# Patient Record
Sex: MALE | Race: BLACK OR AFRICAN AMERICAN | NOT HISPANIC OR LATINO | Employment: UNEMPLOYED | URBAN - METROPOLITAN AREA
[De-identification: names, ages, dates, MRNs, and addresses within clinical notes are randomized per-mention and may not be internally consistent; named-entity substitution may affect disease eponyms.]

---

## 2019-09-11 ENCOUNTER — HOSPITAL ENCOUNTER (EMERGENCY)
Facility: HOSPITAL | Age: 34
Discharge: HOME | End: 2019-09-11
Attending: EMERGENCY MEDICINE
Payer: MEDICAID

## 2019-09-11 VITALS
SYSTOLIC BLOOD PRESSURE: 125 MMHG | DIASTOLIC BLOOD PRESSURE: 76 MMHG | BODY MASS INDEX: 22.4 KG/M2 | HEIGHT: 71 IN | RESPIRATION RATE: 41 BRPM | HEART RATE: 61 BPM | OXYGEN SATURATION: 98 % | TEMPERATURE: 99 F | WEIGHT: 160 LBS

## 2019-09-11 DIAGNOSIS — R10.84 GENERALIZED ABDOMINAL PAIN: ICD-10-CM

## 2019-09-11 DIAGNOSIS — Z91.199 MEDICALLY NONCOMPLIANT: ICD-10-CM

## 2019-09-11 DIAGNOSIS — R11.2 NON-INTRACTABLE VOMITING WITH NAUSEA, UNSPECIFIED VOMITING TYPE: Primary | ICD-10-CM

## 2019-09-11 LAB
ALBUMIN SERPL BCP-MCNC: 5 G/DL (ref 3.5–5.2)
ALP SERPL-CCNC: 114 U/L (ref 55–135)
ALT SERPL W/O P-5'-P-CCNC: 19 U/L (ref 10–44)
AMPHET+METHAMPHET UR QL: NEGATIVE
ANION GAP SERPL CALC-SCNC: 13 MMOL/L (ref 8–16)
AST SERPL-CCNC: 39 U/L (ref 10–40)
BACTERIA #/AREA URNS HPF: ABNORMAL /HPF
BARBITURATES UR QL SCN>200 NG/ML: NEGATIVE
BASOPHILS # BLD AUTO: 0.01 K/UL (ref 0–0.2)
BASOPHILS NFR BLD: 0.1 % (ref 0–1.9)
BENZODIAZ UR QL SCN>200 NG/ML: NEGATIVE
BILIRUB SERPL-MCNC: 1.5 MG/DL (ref 0.1–1)
BILIRUB UR QL STRIP: NEGATIVE
BUN SERPL-MCNC: 20 MG/DL (ref 6–20)
BZE UR QL SCN: NORMAL
CALCIUM SERPL-MCNC: 10.6 MG/DL (ref 8.7–10.5)
CANNABINOIDS UR QL SCN: NORMAL
CHLORIDE SERPL-SCNC: 97 MMOL/L (ref 95–110)
CLARITY UR: CLEAR
CO2 SERPL-SCNC: 25 MMOL/L (ref 23–29)
COLOR UR: YELLOW
CREAT SERPL-MCNC: 1.3 MG/DL (ref 0.5–1.4)
CREAT UR-MCNC: 310.2 MG/DL (ref 23–375)
DIFFERENTIAL METHOD: ABNORMAL
EOSINOPHIL # BLD AUTO: 0 K/UL (ref 0–0.5)
EOSINOPHIL NFR BLD: 0.1 % (ref 0–8)
ERYTHROCYTE [DISTWIDTH] IN BLOOD BY AUTOMATED COUNT: 13.1 % (ref 11.5–14.5)
EST. GFR  (AFRICAN AMERICAN): >60 ML/MIN/1.73 M^2
EST. GFR  (NON AFRICAN AMERICAN): >60 ML/MIN/1.73 M^2
GLUCOSE SERPL-MCNC: 93 MG/DL (ref 70–110)
GLUCOSE UR QL STRIP: NEGATIVE
HCT VFR BLD AUTO: 44.3 % (ref 40–54)
HGB BLD-MCNC: 15.6 G/DL (ref 14–18)
HGB UR QL STRIP: ABNORMAL
HYALINE CASTS #/AREA URNS LPF: 0 /LPF
KETONES UR QL STRIP: ABNORMAL
LEUKOCYTE ESTERASE UR QL STRIP: NEGATIVE
LIPASE SERPL-CCNC: 23 U/L (ref 4–60)
LYMPHOCYTES # BLD AUTO: 1.9 K/UL (ref 1–4.8)
LYMPHOCYTES NFR BLD: 26.5 % (ref 18–48)
MCH RBC QN AUTO: 33.6 PG (ref 27–31)
MCHC RBC AUTO-ENTMCNC: 35.2 G/DL (ref 32–36)
MCV RBC AUTO: 96 FL (ref 82–98)
METHADONE UR QL SCN>300 NG/ML: NEGATIVE
MICROSCOPIC COMMENT: ABNORMAL
MONOCYTES # BLD AUTO: 0.5 K/UL (ref 0.3–1)
MONOCYTES NFR BLD: 7.3 % (ref 4–15)
NEUTROPHILS # BLD AUTO: 4.7 K/UL (ref 1.8–7.7)
NEUTROPHILS NFR BLD: 66 % (ref 38–73)
NITRITE UR QL STRIP: NEGATIVE
OPIATES UR QL SCN: NEGATIVE
PCP UR QL SCN>25 NG/ML: NEGATIVE
PH UR STRIP: 6 [PH] (ref 5–8)
PLATELET # BLD AUTO: 187 K/UL (ref 150–350)
PMV BLD AUTO: 10.2 FL (ref 9.2–12.9)
POTASSIUM SERPL-SCNC: 3.3 MMOL/L (ref 3.5–5.1)
PROT SERPL-MCNC: 11 G/DL (ref 6–8.4)
PROT UR QL STRIP: ABNORMAL
RBC # BLD AUTO: 4.64 M/UL (ref 4.6–6.2)
RBC #/AREA URNS HPF: 3 /HPF (ref 0–4)
SODIUM SERPL-SCNC: 135 MMOL/L (ref 136–145)
SP GR UR STRIP: 1.02 (ref 1–1.03)
TOXICOLOGY INFORMATION: NORMAL
URN SPEC COLLECT METH UR: ABNORMAL
UROBILINOGEN UR STRIP-ACNC: 1 EU/DL
WBC # BLD AUTO: 7.14 K/UL (ref 3.9–12.7)
WBC #/AREA URNS HPF: 2 /HPF (ref 0–5)

## 2019-09-11 PROCEDURE — 96374 THER/PROPH/DIAG INJ IV PUSH: CPT

## 2019-09-11 PROCEDURE — 25000003 PHARM REV CODE 250: Performed by: EMERGENCY MEDICINE

## 2019-09-11 PROCEDURE — 99284 EMERGENCY DEPT VISIT MOD MDM: CPT | Mod: 25

## 2019-09-11 PROCEDURE — 80307 DRUG TEST PRSMV CHEM ANLYZR: CPT

## 2019-09-11 PROCEDURE — 80053 COMPREHEN METABOLIC PANEL: CPT

## 2019-09-11 PROCEDURE — 81000 URINALYSIS NONAUTO W/SCOPE: CPT | Mod: 59

## 2019-09-11 PROCEDURE — 96361 HYDRATE IV INFUSION ADD-ON: CPT

## 2019-09-11 PROCEDURE — 83690 ASSAY OF LIPASE: CPT

## 2019-09-11 PROCEDURE — 63600175 PHARM REV CODE 636 W HCPCS: Performed by: EMERGENCY MEDICINE

## 2019-09-11 PROCEDURE — 85025 COMPLETE CBC W/AUTO DIFF WBC: CPT

## 2019-09-11 RX ORDER — CAPSAICIN 0.75 MG/G
CREAM TOPICAL 3 TIMES DAILY
Status: DISCONTINUED | OUTPATIENT
Start: 2019-09-11 | End: 2019-09-11 | Stop reason: HOSPADM

## 2019-09-11 RX ORDER — ONDANSETRON 4 MG/1
4 TABLET, FILM COATED ORAL EVERY 6 HOURS
Qty: 12 TABLET | Refills: 0 | Status: SHIPPED | OUTPATIENT
Start: 2019-09-11

## 2019-09-11 RX ORDER — SODIUM CHLORIDE 9 MG/ML
1000 INJECTION, SOLUTION INTRAVENOUS
Status: COMPLETED | OUTPATIENT
Start: 2019-09-11 | End: 2019-09-11

## 2019-09-11 RX ORDER — PROMETHAZINE HYDROCHLORIDE 25 MG/1
25 TABLET ORAL EVERY 6 HOURS PRN
Qty: 15 TABLET | Refills: 0 | Status: SHIPPED | OUTPATIENT
Start: 2019-09-11

## 2019-09-11 RX ORDER — HALOPERIDOL 5 MG/ML
5 INJECTION INTRAMUSCULAR
Status: COMPLETED | OUTPATIENT
Start: 2019-09-11 | End: 2019-09-11

## 2019-09-11 RX ADMIN — SODIUM CHLORIDE 1000 ML: 0.9 INJECTION, SOLUTION INTRAVENOUS at 05:09

## 2019-09-11 RX ADMIN — HALOPERIDOL LACTATE 5 MG: 5 INJECTION, SOLUTION INTRAMUSCULAR at 05:09

## 2019-09-11 RX ADMIN — CAPSAICIN: 0.75 CREAM TOPICAL at 06:09

## 2019-09-11 NOTE — ED NOTES
Pt reports improvement in abd pain and nausea. States pain is 7/10. Pt is NAD at this time resting comfortably on cardiac monitor. Will continue to monitor.

## 2019-09-11 NOTE — ED PROVIDER NOTES
Encounter Date: 9/11/2019    SCRIBE #1 NOTE: I, Keely Hines, am scribing for, and in the presence of,  Dr. Lefort. I have scribed the entire note.       History     Chief Complaint   Patient presents with    Abdominal Pain     Abdominal pain with N/V x 2 days. Presents awake, alert, oriented. States pain is severe. No h/o fever. Parent states that patient has been seen before for similar complaint     This is a 33 y.o. male who presents with complaint of vomiting. He reports onset of symptoms was 2 days ago. He denies any blood in vomit. The patient has associated abdominal pain and back pain but denies any diarrhea, urinary symptoms, fever or chills. He is not taking any medications for the symptoms. As per medical record the patient has experienced similar symptoms multiple times in the past. Of note the patient is non-compliant with HIV medications. He is uncertain of his last CD4 count    The history is provided by the patient.     Review of patient's allergies indicates:   Allergen Reactions    Maalox [alum-mag hydroxide-simeth]      Past Medical History:   Diagnosis Date    Asthma     HIV (human immunodeficiency virus infection)      History reviewed. No pertinent surgical history.  History reviewed. No pertinent family history.  Social History     Tobacco Use    Smoking status: Current Every Day Smoker     Types: Cigarettes    Smokeless tobacco: Never Used   Substance Use Topics    Alcohol use: No    Drug use: No     Review of Systems   Constitutional: Negative for chills and fever.   HENT: Negative for congestion, ear pain, rhinorrhea and sore throat.    Respiratory: Negative for cough, shortness of breath and wheezing.    Cardiovascular: Negative for chest pain and palpitations.   Gastrointestinal: Positive for abdominal pain, nausea and vomiting. Negative for diarrhea.   Genitourinary: Negative for dysuria and hematuria.   Musculoskeletal: Positive for back pain. Negative for myalgias and neck  pain.   Skin: Negative for rash.   Neurological: Negative for dizziness, weakness, light-headedness and headaches.   Psychiatric/Behavioral: Negative for confusion.       Physical Exam     Initial Vitals [09/11/19 1722]   BP Pulse Resp Temp SpO2   (!) 138/91 73 20 98.2 °F (36.8 °C) 100 %      MAP       --         Physical Exam    Nursing note and vitals reviewed.  Constitutional: He appears well-developed and well-nourished. He is not diaphoretic. He appears distressed.   Writhing in bed   HENT:   Head: Normocephalic and atraumatic.   Mouth/Throat: Oropharynx is clear and moist.   Eyes: Conjunctivae and EOM are normal.   Neck: Normal range of motion. Neck supple.   Cardiovascular: Normal rate, regular rhythm and normal heart sounds. Exam reveals no gallop and no friction rub.    No murmur heard.  Pulmonary/Chest: Breath sounds normal. He has no wheezes. He has no rhonchi. He has no rales.   Abdominal: Soft. There is tenderness (mild diffuse). There is no rebound and no guarding.   Musculoskeletal: Normal range of motion. He exhibits no edema or tenderness.   Lymphadenopathy:     He has no cervical adenopathy.   Neurological: He is alert and oriented to person, place, and time. He has normal strength.   Skin: Skin is warm and dry. No rash noted.         ED Course   Procedures  Labs Reviewed   CBC W/ AUTO DIFFERENTIAL - Abnormal; Notable for the following components:       Result Value    Mean Corpuscular Hemoglobin 33.6 (*)     All other components within normal limits   COMPREHENSIVE METABOLIC PANEL - Abnormal; Notable for the following components:    Sodium 135 (*)     Potassium 3.3 (*)     Calcium 10.6 (*)     Total Protein 11.0 (*)     Total Bilirubin 1.5 (*)     All other components within normal limits   URINALYSIS, REFLEX TO URINE CULTURE - Abnormal; Notable for the following components:    Protein, UA 1+ (*)     Ketones, UA 1+ (*)     Occult Blood UA Trace (*)     All other components within normal limits     Narrative:     Preferred Collection Type->Urine, Clean Catch   URINALYSIS MICROSCOPIC - Abnormal; Notable for the following components:    Bacteria Few (*)     All other components within normal limits    Narrative:     Preferred Collection Type->Urine, Clean Catch   LIPASE   DRUG SCREEN PANEL, URINE EMERGENCY    Narrative:     Preferred Collection Type->Urine, Clean Catch          Imaging Results    None          Medical Decision Making:   Differential Diagnosis:   Differential Diagnosis includes, but is not limited to:  AAA, aortic dissection, mesenteric ischemia, perforated viscous, MI/ACS, SBO/volvulus, incarcerated/strangulated hernia, intussusception, ileus, appendicitis, cholecystitis, cholangitis, diverticulitis, esophagitis, hepatitis, nephrolithiasis, pancreatitis, gastroenteritis, colitis, IBD/IBS, biliary colic, GERD, PUD, constipation, UTI/pyelonephritis,  disorder.    Clinical Tests:   Lab Tests: Ordered and Reviewed  ED Management:  After complete evaluation, including thorough history and physical exam, the patient's symptoms are most consistent with benign cause of abdominal pain. There is no rebound/guarding or other peritoneal signs to suggest perforation or other emergent surgical process. There is no fever or leukocytosis to suggest acute bacterial infection. There is no significant focal abdominal tenderness to suggest cholecystitis, appendicitis, diverticulitis, or  source, and the patient's current symptoms and clinical presentation do not warrant other targeted diagnostics at this time. CT A/P felt unlikely to outweigh risks of contrast/radiation at this time. The patient was treated with supportive care and improved. Will provide RX for sypmtoms upon D/C.                        Clinical Impression:     1. Non-intractable vomiting with nausea, unspecified vomiting type    2. Generalized abdominal pain    3. Medically noncompliant        Disposition:   Disposition: Discharged  Condition:  Stable    Scribe attestation I, Dr. Guy Lefort, personally performed the services described in this documentation. All medical record entries made by the scribe were at my direction and in my presence. I have reviewed the chart and agree that the record reflects my personal performance and is accurate and complete. Guy Lefort, MD.  11:26 PM 09/12/2019                      Guy J. Lefort, MD  09/12/19 3160

## 2019-09-11 NOTE — ED NOTES
Pt is asleep. Pt resting comfortably on stretcher. Pt's O2 sat is 89% on room air. Pt easily aroused and O2 sat is 97% when conversing. Pt placed on 2L oxygen via nasal cannula. Will continue to monitor.

## 2019-09-12 NOTE — DISCHARGE INSTRUCTIONS
You need follow up for HIV management, which will lead to fatal illness if left untreated.  Close follow up STRONGLY encouraged.

## 2019-09-12 NOTE — ED NOTES
Pt presents to the ED w/ c/ of abd pain with n/v for the past 2days. Pt is anxious at this time. Pt reports generalized weakness/ fatigue. Reports multiple episodes of emesis over the past 2 days. Pt reports hx of similar symptoms in the past. Pt is awake, alert, orientedx4. Pt denies fevers.

## 2021-02-21 ENCOUNTER — HOSPITAL ENCOUNTER (EMERGENCY)
Facility: OTHER | Age: 36
Discharge: HOME | End: 2021-02-21
Attending: EMERGENCY MEDICINE
Payer: MEDICAID

## 2021-02-21 VITALS
WEIGHT: 147 LBS | RESPIRATION RATE: 16 BRPM | BODY MASS INDEX: 20.58 KG/M2 | DIASTOLIC BLOOD PRESSURE: 71 MMHG | OXYGEN SATURATION: 100 % | HEART RATE: 89 BPM | TEMPERATURE: 99 F | HEIGHT: 71 IN | SYSTOLIC BLOOD PRESSURE: 116 MMHG

## 2021-02-21 DIAGNOSIS — K62.89 PROCTITIS: Primary | ICD-10-CM

## 2021-02-21 LAB
ALLENS TEST: ABNORMAL
ANION GAP SERPL CALC-SCNC: 13 MMOL/L (ref 8–16)
BUN SERPL-MCNC: 12 MG/DL (ref 6–20)
CALCIUM SERPL-MCNC: 9.4 MG/DL (ref 8.7–10.5)
CHLORIDE SERPL-SCNC: 100 MMOL/L (ref 95–110)
CO2 SERPL-SCNC: 23 MMOL/L (ref 23–29)
CREAT SERPL-MCNC: 0.9 MG/DL (ref 0.5–1.4)
EST. GFR  (AFRICAN AMERICAN): >60 ML/MIN/1.73 M^2
EST. GFR  (NON AFRICAN AMERICAN): >60 ML/MIN/1.73 M^2
GLUCOSE SERPL-MCNC: 90 MG/DL (ref 70–110)
HCO3 UR-SCNC: 30.6 MMOL/L (ref 24–28)
HCT VFR BLD CALC: 44 %PCV (ref 36–54)
HGB BLD-MCNC: 15 G/DL
PCO2 BLDA: 51.8 MMHG (ref 35–45)
PH SMN: 7.38 [PH] (ref 7.35–7.45)
PO2 BLDA: 19 MMHG (ref 40–60)
POC BE: 5 MMOL/L
POC IONIZED CALCIUM: 1.23 MMOL/L (ref 1.06–1.42)
POC SATURATED O2: 27 % (ref 95–100)
POC TCO2: 32 MMOL/L (ref 24–29)
POTASSIUM BLD-SCNC: 3.3 MMOL/L (ref 3.5–5.1)
POTASSIUM SERPL-SCNC: 3.5 MMOL/L (ref 3.5–5.1)
SAMPLE: ABNORMAL
SITE: ABNORMAL
SODIUM BLD-SCNC: 140 MMOL/L (ref 136–145)
SODIUM SERPL-SCNC: 136 MMOL/L (ref 136–145)

## 2021-02-21 PROCEDURE — 80048 BASIC METABOLIC PNL TOTAL CA: CPT

## 2021-02-21 PROCEDURE — 84295 ASSAY OF SERUM SODIUM: CPT | Mod: 91

## 2021-02-21 PROCEDURE — 84132 ASSAY OF SERUM POTASSIUM: CPT | Mod: 91

## 2021-02-21 PROCEDURE — 99900035 HC TECH TIME PER 15 MIN (STAT)

## 2021-02-21 PROCEDURE — 99285 EMERGENCY DEPT VISIT HI MDM: CPT | Mod: 25

## 2021-02-21 PROCEDURE — 25000003 PHARM REV CODE 250: Performed by: EMERGENCY MEDICINE

## 2021-02-21 PROCEDURE — 82803 BLOOD GASES ANY COMBINATION: CPT

## 2021-02-21 PROCEDURE — 25500020 PHARM REV CODE 255: Performed by: EMERGENCY MEDICINE

## 2021-02-21 PROCEDURE — 85014 HEMATOCRIT: CPT

## 2021-02-21 PROCEDURE — 82330 ASSAY OF CALCIUM: CPT

## 2021-02-21 PROCEDURE — 86361 T CELL ABSOLUTE COUNT: CPT

## 2021-02-21 RX ORDER — DOXYCYCLINE 100 MG/1
100 CAPSULE ORAL 2 TIMES DAILY
Qty: 42 CAPSULE | Refills: 0 | Status: SHIPPED | OUTPATIENT
Start: 2021-02-21 | End: 2021-03-14

## 2021-02-21 RX ORDER — DOXYCYCLINE HYCLATE 100 MG
100 TABLET ORAL ONCE
Status: COMPLETED | OUTPATIENT
Start: 2021-02-21 | End: 2021-02-21

## 2021-02-21 RX ADMIN — DOXYCYCLINE HYCLATE 100 MG: 100 TABLET, COATED ORAL at 08:02

## 2021-02-21 RX ADMIN — IOHEXOL 75 ML: 350 INJECTION, SOLUTION INTRAVENOUS at 07:02

## 2021-02-23 LAB
CD3+CD4+ CELLS # BLD: 596 CELLS/UL (ref 300–1400)
CD3+CD4+ CELLS NFR BLD: 35.8 % (ref 28–57)

## 2024-07-02 ENCOUNTER — HOSPITAL ENCOUNTER (EMERGENCY)
Facility: HOSPITAL | Age: 39
Discharge: HOME | End: 2024-07-02
Attending: EMERGENCY MEDICINE
Payer: COMMERCIAL

## 2024-07-02 VITALS
HEIGHT: 71 IN | SYSTOLIC BLOOD PRESSURE: 104 MMHG | WEIGHT: 150 LBS | HEART RATE: 71 BPM | OXYGEN SATURATION: 98 % | TEMPERATURE: 98 F | RESPIRATION RATE: 16 BRPM | DIASTOLIC BLOOD PRESSURE: 65 MMHG | BODY MASS INDEX: 21 KG/M2

## 2024-07-02 DIAGNOSIS — R20.2 PARESTHESIA OF FINGER: ICD-10-CM

## 2024-07-02 DIAGNOSIS — R53.1 WEAKNESS: Primary | ICD-10-CM

## 2024-07-02 LAB
BUN SERPL-MCNC: 14 MG/DL (ref 6–30)
CHLORIDE SERPL-SCNC: 105 MMOL/L (ref 95–110)
CREAT SERPL-MCNC: 1.4 MG/DL (ref 0.5–1.4)
GLUCOSE SERPL-MCNC: 96 MG/DL (ref 70–110)
HCT VFR BLD CALC: 40 %PCV (ref 36–54)
POC IONIZED CALCIUM: 1.2 MMOL/L (ref 1.06–1.42)
POC TCO2 (MEASURED): 25 MMOL/L (ref 23–29)
POTASSIUM BLD-SCNC: 4.1 MMOL/L (ref 3.5–5.1)
SAMPLE: NORMAL
SODIUM BLD-SCNC: 142 MMOL/L (ref 136–145)

## 2024-07-02 PROCEDURE — 99282 EMERGENCY DEPT VISIT SF MDM: CPT

## 2024-07-02 PROCEDURE — 80047 BASIC METABLC PNL IONIZED CA: CPT

## 2024-07-02 NOTE — ED TRIAGE NOTES
Tom Hannon, a 38 y.o. male presents to the ED w/ complaint of dizziness from laying to standing starting this morning, feels dehydrated and finger tips numb for 2 weeks    Triage note:  Chief Complaint   Patient presents with    Numbness     Numbness to l index, middle and ring constant for 2 weeks, has in morning, get dizzy getting up and down     Review of patient's allergies indicates:   Allergen Reactions    Haldol [haloperidol lactate] Swelling     Throat swelling    Maalox [alum-mag hydroxide-simeth]      Past Medical History:   Diagnosis Date    Asthma     HIV (human immunodeficiency virus infection)          APPEARANCE: awake and alert in NAD. PAIN  0/10  SKIN: warm, dry and intact. No breakdown or bruising.  MUSCULOSKELETAL: Patient moving all extremities spontaneously, no obvious swelling or deformities noted. Ambulates independently.  RESPIRATORY: Denies shortness of breath.Respirations unlabored.   CARDIAC: Denies CP, 2+ distal pulses; no peripheral edema  ABDOMEN: S/ND/NT, Denies nausea  : voids spontaneously, denies difficulty  Neurologic: AAO x 4; follows commands equal strength in all extremities; denies numbness/tingling. endorses dizziness

## 2024-07-02 NOTE — ED NOTES
Discharge home with family, states understanding to follow up as directed. Ambulates out of ED without difficulty.ALL INFORMATION PER PROVIDER STAFF    
I-STAT Chem-8+ Results:   Value Reference Range   Sodium 142 136-145 mmol/L   Potassium  4.1 3.5-5.1 mmol/L   Chloride 105  mmol/L   Ionized Calcium 1.20 1.06-1.42 mmol/L   CO2 (measured) 25 23-29 mmol/L   Glucose 96  mg/dL   BUN 14 6-30 mg/dL   Creatinine 1.4 0.5-1.4 mg/dL   Hematocrit 40 36-54%       
1
present
Principal Discharge DX:	Headache  Secondary Diagnosis:	Neck pain

## 2024-07-02 NOTE — Clinical Note
"Tom Garciamaximilian Hannon was seen and treated in our emergency department on 7/2/2024.  He may return to work on 07/03/2024.       If you have any questions or concerns, please don't hesitate to call.      Katerin Arceo MD"

## 2024-07-02 NOTE — DISCHARGE INSTRUCTIONS
Glucose is normal.  Your electrolytes otherwise are stable.  Please increase your oral fluid intake.  Your vital signs are stable.  Follow-up with your PCP regarding your tingling in your finger

## 2024-07-02 NOTE — ED PROVIDER NOTES
Encounter Date: 7/2/2024       History     Chief Complaint   Patient presents with    Numbness     Numbness to l index, middle and ring constant for 2 weeks, has in morning, get dizzy getting up and down     Tommalik Hannon is a 38-year-old male with a history of HIV, asthma presenting today with generalized weakness, feels lightheaded when he stands up from a sitting position and tingling to the index, middle and ring finger of the left hand.  He has been having the hand paresthesias for the past 2 weeks.  It is constant.  He is concerned he could have diabetes.  It was only located at the tips of his finger, does not radiate up the finger or affect the hand.  He denies associated weakness.  He also states he feels dehydrated, generally weak but denies fever, chills, cough.  No abdominal pain, nausea or vomiting.  He has been out of his HIV medications for the past couple days but plans to go to the pharmacy today to  the meds after he leaves here.  He has requesting a work note      Review of patient's allergies indicates:   Allergen Reactions    Haldol [haloperidol lactate] Swelling     Throat swelling    Maalox [alum-mag hydroxide-simeth]      Past Medical History:   Diagnosis Date    Asthma     HIV (human immunodeficiency virus infection)      History reviewed. No pertinent surgical history.  No family history on file.  Social History     Tobacco Use    Smoking status: Every Day     Types: Cigarettes    Smokeless tobacco: Never   Substance Use Topics    Alcohol use: No    Drug use: No     Review of Systems    Physical Exam     Initial Vitals [07/02/24 1312]   BP Pulse Resp Temp SpO2   115/71 96 18 98.4 °F (36.9 °C) 96 %      MAP       --         Physical Exam    Nursing note and vitals reviewed.  Constitutional: He appears well-developed and well-nourished. No distress.   HENT:   Mouth/Throat: Oropharynx is clear and moist.   Eyes: Conjunctivae are normal.   Neck: Neck supple.   Cardiovascular:  Normal  rate and regular rhythm.           + radial pulse intact, normal cap refill   Pulmonary/Chest: Breath sounds normal. He has no wheezes. He has no rales.   Abdominal: Abdomen is soft. Bowel sounds are normal. There is no abdominal tenderness.   Musculoskeletal:         General: No edema.      Cervical back: Neck supple.      Comments: +  flexion, extension, abduction of the fingers normal.     Lymphadenopathy:     He has no cervical adenopathy.   Neurological: He is alert and oriented to person, place, and time. No sensory deficit (Unable to reproduce patient's sensory deficit).   Skin: No rash noted.   Psychiatric: He has a normal mood and affect.         ED Course   Procedures  Labs Reviewed   HEPATITIS C ANTIBODY   ISTAT PROCEDURE   ISTAT CHEM8          Imaging Results    None          Medications - No data to display  Medical Decision Making  Urgent evaluation a 38-year-old male presenting today with multiple complaints.    Vital signs stable.  Overall well-appearing, no acute distress.    Differential includes but not limited to electrolyte derangement, carpal tunnel, dehydration, less likely an occult infection    Plan for i-STAT    I-STAT reviewed, electrolytes within normal limits.  Glucose normal.  Creatinine that is at baseline.  Recommend picking up his medication for the pharmacy, increase fluids.  Follow up with PCP if symptoms persist                                      Clinical Impression:  Final diagnoses:  [R53.1] Weakness (Primary)  [R20.2] Paresthesia of finger          ED Disposition Condition    Discharge Stable          ED Prescriptions    None       Follow-up Information       Follow up With Specialties Details Why Contact Info    Surekha Ocasio NP Internal Medicine Schedule an appointment as soon as possible for a visit   [address]  [place] [address]  [phone]               Katerin Arceo MD  07/02/24 5262

## 2024-08-28 PROCEDURE — 99282 EMERGENCY DEPT VISIT SF MDM: CPT

## 2024-08-29 ENCOUNTER — HOSPITAL ENCOUNTER (EMERGENCY)
Facility: HOSPITAL | Age: 39
Discharge: HOME | End: 2024-08-29
Attending: EMERGENCY MEDICINE
Payer: COMMERCIAL

## 2024-08-29 VITALS
DIASTOLIC BLOOD PRESSURE: 83 MMHG | WEIGHT: 149.94 LBS | OXYGEN SATURATION: 99 % | TEMPERATURE: 98 F | BODY MASS INDEX: 20.91 KG/M2 | RESPIRATION RATE: 18 BRPM | SYSTOLIC BLOOD PRESSURE: 118 MMHG | HEART RATE: 76 BPM

## 2024-08-29 DIAGNOSIS — B34.9 VIRAL INFECTION: Primary | ICD-10-CM

## 2024-08-29 LAB
INFLUENZA A, MOLECULAR: NEGATIVE
INFLUENZA B, MOLECULAR: NEGATIVE
SARS-COV-2 RDRP RESP QL NAA+PROBE: NEGATIVE
SPECIMEN SOURCE: NORMAL

## 2024-08-29 PROCEDURE — U0002 COVID-19 LAB TEST NON-CDC: HCPCS | Performed by: EMERGENCY MEDICINE

## 2024-08-29 PROCEDURE — 87502 INFLUENZA DNA AMP PROBE: CPT

## 2024-08-29 NOTE — Clinical Note
"Tom"Jenaro Hannon was seen and treated in our emergency department on 8/28/2024.  He may return to work on 08/29/2024.       If you have any questions or concerns, please don't hesitate to call.      Samad, Mawadah, MD"

## 2024-08-29 NOTE — ED PROVIDER NOTES
Encounter Date: 8/28/2024       History     Chief Complaint   Patient presents with    COVID-19 Concerns     Pt has c/o headache, body aches, subjective, fever, chills x 3 days. Recent exposure to COVID.      Tom Hannon, 38M with PMHx of asthma and HIV presenting with concerns for exposure to COVID and Flu. Patient states he has been having headaches, body aches, fatigue, night sweats, lightheadedness, nausea, dizziness for the past 3 days. Endorses sick contact exposure to both COVID and Flu. Has taken nyquil, dayquil, promethazine and aspirin with some improvement. Denies any vomiting, shortness of breath, chest pain        Review of patient's allergies indicates:   Allergen Reactions    Haldol [haloperidol lactate] Swelling     Throat swelling    Maalox [alum-mag hydroxide-simeth]      Past Medical History:   Diagnosis Date    Asthma     HIV (human immunodeficiency virus infection)      History reviewed. No pertinent surgical history.  No family history on file.  Social History     Tobacco Use    Smoking status: Every Day     Types: Cigarettes    Smokeless tobacco: Never   Substance Use Topics    Alcohol use: No    Drug use: No     Review of Systems   Constitutional:  Negative for chills and fever.        Night sweats   Respiratory:  Negative for cough, shortness of breath and wheezing.    Cardiovascular:  Negative for chest pain and leg swelling.   Gastrointestinal:  Positive for nausea. Negative for abdominal pain, diarrhea and vomiting.   Genitourinary:  Negative for dysuria.   Musculoskeletal:  Positive for myalgias.   Neurological:  Positive for headaches.       Physical Exam     Initial Vitals [08/28/24 2307]   BP Pulse Resp Temp SpO2   (!) 146/104 101 18 98.7 °F (37.1 °C) 98 %      MAP       --         Physical Exam    Nursing note and vitals reviewed.  Constitutional: He appears well-developed and well-nourished.   HENT:   Head: Normocephalic and atraumatic.   Eyes: Conjunctivae are normal.   Neck:    Normal range of motion.  Cardiovascular:  Normal rate, regular rhythm and normal heart sounds.           No murmur heard.  Pulmonary/Chest: Breath sounds normal. No respiratory distress. He has no wheezes.   Abdominal: Abdomen is soft. There is no abdominal tenderness. There is no rebound and no guarding.   Musculoskeletal:         General: No edema.      Cervical back: Normal range of motion.     Neurological: He is oriented to person, place, and time. GCS score is 15. GCS eye subscore is 4. GCS verbal subscore is 5. GCS motor subscore is 6.   Skin: Skin is warm.   Psychiatric: He has a normal mood and affect.         ED Course   Procedures  Labs Reviewed   INFLUENZA A & B BY MOLECULAR       Result Value    Influenza A, Molecular Negative      Influenza B, Molecular Negative      Flu A & B Source Nasal swab     SARS-COV-2 RNA AMPLIFICATION, QUAL    SARS-CoV-2 RNA, Amplification, Qual Negative            Imaging Results    None          Medications - No data to display  Medical Decision Making  Tom Hannon, 38M with PMHx of asthma and HIV presenting for concerns of having COVID and Flu given his sick contact exposure. Physical examination unremarkable. Will obtain COVID and Flu swab given symptoms. COVID and Flu negative.                                       Clinical Impression:  Final diagnoses:  [B34.9] Viral infection (Primary)          ED Disposition Condition    Discharge Stable          ED Prescriptions    None       Follow-up Information    None          Samad, Mawadah, MD  Resident  08/29/24 8025

## 2024-08-29 NOTE — Clinical Note
"Tom"Jenaro Hannno was seen and treated in our emergency department on 8/28/2024.  He may return to work on 08/29/2024.       If you have any questions or concerns, please don't hesitate to call.      Samad, Mawadah, MD"

## 2024-08-29 NOTE — ED NOTES
Pt reports he was around someone with the flu and covid and he wanted to get tested. Pt reports symptoms of dizziness, chills, headache, body aches.     Adult Physical Assessment  LOC: Tom Hannon, 38 y.o. male verified via two identifiers.  The patient is awake, alert, oriented and speaking appropriately at this time.  APPEARANCE: Patient resting comfortably and appears to be in no acute distress at this time. Patient is clean and well groomed, patient's clothing is properly fastened.  SKIN:The skin is warm and dry, color consistent with ethnicity, patient has normal skin turgor and moist mucus membranes, skin intact, no breakdown or brusing noted.  MUSCULOSKELETAL: Patient moving all extremities well, no obvious swelling or deformities noted. Pt reports chills and body aches.   RESPIRATORY: Airway is open and patent, respirations are spontaneous, patient has a normal effort and rate, no accessory muscle use noted. Pt denies SOB.   CARDIAC: Patient has a normal rate and rhythm, no periphreal edema noted in any extremity  ABDOMEN: Pt denies abdominal pain.  NEUROLOGIC: Eyes open spontaneously, behavior appropriate to situation, follows commands, facial expression symmetrical,  purposeful motor response noted +headache, dizziness     09-May-2022 16:08

## 2024-09-09 ENCOUNTER — HOSPITAL ENCOUNTER (EMERGENCY)
Facility: HOSPITAL | Age: 39
Discharge: HOME | End: 2024-09-10
Attending: EMERGENCY MEDICINE
Payer: COMMERCIAL

## 2024-09-09 DIAGNOSIS — S20.159A: Primary | ICD-10-CM

## 2024-09-09 DIAGNOSIS — L08.9: Primary | ICD-10-CM

## 2024-09-09 DIAGNOSIS — R00.0 TACHYCARDIA: ICD-10-CM

## 2024-09-09 DIAGNOSIS — L30.8 PRURITIC DERMATITIS: ICD-10-CM

## 2024-09-09 PROCEDURE — 93010 ELECTROCARDIOGRAM REPORT: CPT | Mod: ,,, | Performed by: INTERNAL MEDICINE

## 2024-09-09 PROCEDURE — 25000003 PHARM REV CODE 250: Performed by: EMERGENCY MEDICINE

## 2024-09-09 PROCEDURE — 99284 EMERGENCY DEPT VISIT MOD MDM: CPT | Mod: 25

## 2024-09-09 PROCEDURE — 93005 ELECTROCARDIOGRAM TRACING: CPT

## 2024-09-09 RX ORDER — DOXYCYCLINE HYCLATE 100 MG
100 TABLET ORAL
Status: COMPLETED | OUTPATIENT
Start: 2024-09-09 | End: 2024-09-09

## 2024-09-09 RX ADMIN — DOXYCYCLINE HYCLATE 100 MG: 100 TABLET, COATED ORAL at 10:09

## 2024-09-09 NOTE — Clinical Note
"Tom Hoover" Parvez was seen and treated in our emergency department on 9/9/2024.  He may return to work on 09/12/2024.       If you have any questions or concerns, please don't hesitate to call.      Brayden ZARATE RN    "

## 2024-09-09 NOTE — LETTER
September 13, 2024    Tom Hannon  [address] [facility] [address] [facility] [address]                   [address] [facility] [address]  Phone: [phone]  Fax: [phone]   Dear Mr. Tom Hannon:    We have not been able to contact you by phone regarding abnormal test results from your recent ER visit.  Please contact the ER as soon as possible at [phone] to discuss your results.      Sincerely,        Vicenta Kelley PA-C

## 2024-09-09 NOTE — Clinical Note
"Tom Garciaone" Parvez was seen and treated in our emergency department on 9/9/2024.  He may return to work on 09/14/2024.       If you have any questions or concerns, please don't hesitate to call.      Brayden Castellanos RN    "

## 2024-09-10 VITALS
TEMPERATURE: 99 F | WEIGHT: 149.94 LBS | RESPIRATION RATE: 20 BRPM | SYSTOLIC BLOOD PRESSURE: 131 MMHG | BODY MASS INDEX: 20.91 KG/M2 | HEART RATE: 100 BPM | OXYGEN SATURATION: 98 % | DIASTOLIC BLOOD PRESSURE: 89 MMHG

## 2024-09-10 LAB
OHS QRS DURATION: 86 MS
OHS QRS DURATION: 88 MS
OHS QTC CALCULATION: 447 MS
OHS QTC CALCULATION: 450 MS
RPR SER QL: REACTIVE
RPR SER-TITR: ABNORMAL {TITER}
TREPONEMA PALLIDUM IGG+IGM AB [PRESENCE] IN SERUM OR PLASMA BY IMMUNOASSAY: REACTIVE

## 2024-09-10 PROCEDURE — 86592 SYPHILIS TEST NON-TREP QUAL: CPT

## 2024-09-10 PROCEDURE — 25000003 PHARM REV CODE 250: Performed by: EMERGENCY MEDICINE

## 2024-09-10 PROCEDURE — 87593 ORTHOPOXVIRUS AMP PRB EACH: CPT

## 2024-09-10 PROCEDURE — 86593 SYPHILIS TEST NON-TREP QUANT: CPT | Mod: 91

## 2024-09-10 PROCEDURE — 86593 SYPHILIS TEST NON-TREP QUANT: CPT

## 2024-09-10 RX ORDER — DOXYCYCLINE HYCLATE 100 MG
100 TABLET ORAL 2 TIMES DAILY
Qty: 13 TABLET | Refills: 0 | Status: SHIPPED | OUTPATIENT
Start: 2024-09-10

## 2024-09-10 RX ORDER — LIDOCAINE HYDROCHLORIDE 10 MG/ML
5 INJECTION, SOLUTION EPIDURAL; INFILTRATION; INTRACAUDAL; PERINEURAL
Status: COMPLETED | OUTPATIENT
Start: 2024-09-10 | End: 2024-09-10

## 2024-09-10 RX ORDER — PERMETHRIN 50 MG/G
CREAM TOPICAL ONCE
Qty: 60 G | Refills: 0 | Status: SHIPPED | OUTPATIENT
Start: 2024-09-10 | End: 2024-09-10

## 2024-09-10 RX ADMIN — LIDOCAINE HYDROCHLORIDE 50 MG: 10 SOLUTION INTRAVENOUS at 12:09

## 2024-09-10 NOTE — DISCHARGE INSTRUCTIONS
We are testing you for latent syphilis and monkeypox. Do not have sexual contact with individuals until testing comes back and discussed with primary care physician.    We will be discharging with oral antibiotics and a topical antimicrobial. Please take/apply medications as instructed. If symptoms persist despite treatment, please return to your primary care physician or the emergency department.

## 2024-09-10 NOTE — FIRST PROVIDER EVALUATION
"Medical screening examination initiated.  I have conducted a focused provider triage encounter, findings are as follows:    Brief history of present illness:      "Infected nipple piercing" x 1 week and "bumps on my body"  Itchy bumps on arms, neck, face, legs x3 days  Not sure whether he has bed bugs  Owns a cat that takes flea medicine    There were no vitals filed for this visit.    Pertinent physical exam:      Tachycardic  Scattered paples to arms     Brief workup plan:      EKG  Per primary team    Preliminary workup initiated; this workup will be continued and followed by the physician or advanced practice provider that is assigned to the patient when roomed.  "

## 2024-09-10 NOTE — ED NOTES
"Patient comes into the emergency department by POV with complaints of bug bites. Patient states that he started noticing small white "bites" on his hands around 3 days ago, pt states since then the "bug bites" have spread to upper arms, back, face, chest. Pt states he had a friend living with him after being incarcerated, thinks he may have had "bugs". Pt also reports left nipple pain due to piercing, states he snagged piercing about a week ago, now nipple is swollen also has white discharge. Patient denies SOB, CP, N/V/D.    LOC: The patient is awake, alert and aware of environment with an appropriate affect, the patient is oriented x 3 and speaking appropriately.   APPEARANCE: Patient appears comfortable and in no acute distress, patient is clean and well groomed.  SKIN: The skin is warm and dry, color consistent with ethnicity, patient has normal skin turgor and moist mucus membranes. Pt has various white "bites" throughout face, neck, back, chest, upper thighs. Pictures in chart.  MUSCULOSKELETAL: Patient moving all extremities spontaneously, no swelling noted.  RESPIRATORY: Airway is open and patent, respirations are spontaneous, patient has a normal effort and rate, no accessory muscle. Denies SOB.  CARDIAC: Pt placed on cardiac monitor. Patient has a normal rate and regular rhythm, no edema noted, capillary refill < 3 seconds. Denies CP.  GASTRO: Soft and non tender to palpation, no distention noted.  : Pt denies any pain or frequency with urination.  NEURO: Pt opens eyes spontaneously, behavior appropriate to situation, follows commands, facial expression symmetrical, bilateral hand grasp equal and even, purposeful motor response noted, normal sensation in all extremities when touched with a finger.        "

## 2024-09-10 NOTE — ED NOTES
Nurses Note -- 4 Eyes      9/9/2024   10:58 PM      Skin assessed during: Admit      [x] No Altered Skin Integrity Present    [x]Prevention Measures Documented      [] Yes- Altered Skin Integrity Present or Discovered   [] LDA Added if Not in Epic (Describe Wound)   [] New Altered Skin Integrity was Present on Admit and Documented in LDA   [] Wound Image Taken    Wound Care Consulted? No    Attending Nurse:  Rachael Nath RN/Staff Member:

## 2024-09-10 NOTE — ED PROVIDER NOTES
"Encounter Date: 9/9/2024       History     Chief Complaint   Patient presents with    BUG BITES     Pt has bumps/ bites to upper arms and legs and chest x 3 days. Pt also reports infected piercing x 1 wk.      Patient is a 38 year old male with a history of asthma and HIV presenting to the ED with right nipple pain and "bug bites". Regarding his nipple pain, patient recently got both nipples pierced one week ago, noticed the right nipple began to swell and become painful. 8/10 pain intensity, feels like he needs to "pop it".  Regarding the "bug bites", patient noticed small bumps on his skin appear over the past weeks. They started on the palms of his hands, and have since spread to his chest, back and legs. They are itchy but are not painful. Patient noticed rash after allowing an unkempt friend to stay at his residence. Denies fever, chills, CP, SOB, cough, N/V, abdominal pain.         Review of patient's allergies indicates:   Allergen Reactions    Haldol [haloperidol lactate] Swelling     Throat swelling    Maalox [alum-mag hydroxide-simeth]      Past Medical History:   Diagnosis Date    Asthma     HIV (human immunodeficiency virus infection)      History reviewed. No pertinent surgical history.  No family history on file.  Social History     Tobacco Use    Smoking status: Every Day     Types: Cigarettes    Smokeless tobacco: Never   Substance Use Topics    Alcohol use: No    Drug use: No     Review of Systems    Physical Exam     Initial Vitals [09/09/24 2019]   BP Pulse Resp Temp SpO2   (!) 175/88 (!) 133 18 99.5 °F (37.5 °C) 97 %      MAP       --         Physical Exam    Constitutional: He appears well-developed and well-nourished.   HENT:   Head: Normocephalic and atraumatic.   Cardiovascular:  Normal rate, regular rhythm, normal heart sounds and intact distal pulses.           No murmur heard.  Pulmonary/Chest: Breath sounds normal. He has no wheezes. He has no rhonchi. He has no rales.   Abdominal: " Abdomen is soft. He exhibits no distension. There is no abdominal tenderness.     Neurological: He is alert and oriented to person, place, and time. No cranial nerve deficit.   Skin: Skin is warm and dry.   Multiple small vesicles noted throughout the patient's chest, back, both legs and both palms. They are small, <1 mm. Non-tender, non-erythematous.         ED Course   Foreign Body    Date/Time: 9/10/2024 12:53 AM    Performed by: Mack Haynes MD  Authorized by: Jose J Andre MD  Consent Done: Yes  Consent: Verbal consent obtained.  Consent given by: patient  Body area: skin  General location: trunk  Location details: right breast  Anesthesia: local infiltration    Anesthesia:  Local Anesthetic: lidocaine 1% without epinephrine  Anesthetic total: 2 mL    Patient sedated: no  Patient cooperative: yes  Complexity: simple  1 objects recovered.  Post-procedure assessment: foreign body removed  Comments: Bloody purulence expressed.       Labs Reviewed   TREPONEMA PALLIDIUM ANTIBODIES IGG, IGM   MPOX PCR          Imaging Results    None          Medications   doxycycline tablet 100 mg (100 mg Oral Given 9/9/24 1335)   LIDOcaine (PF) 10 mg/ml (1%) injection 50 mg (50 mg Infiltration Given 9/10/24 0015)     Medical Decision Making  The patient is a 38 year old male with a history of asthma and HIV presenting for right nipple pain and for pruritic dermatitis. Regarding his right nipple pain, the primary differential at this time is an infected nipple piercing, given his recent piercing and progressive onset of symptoms after piercing. Nipple piercing was removed in the emergency department with local anesthetic. Some bloody purulence was expressed, with noted reduction in size of the right nipple compared to presentation. Patient placed on 7 day course of oral doxycycline for suspected infection.     Regarding his pruritic dermatitis, the differential diagnosis remains somewhat broad at this time. The primary  differential at this time include bed bugs and scabies, given the diffuse itchy nature of the lesions present and the patient's recent proximity to an unkempt friend in his apartment. Syphilis should be considered due to his HIV history and considering the fact that the rash started in his palms and spread from there. However, he has not had recent high risk sexual contact, and his lack of other syphilitic symptoms makes this less likely. Other differentials considered include monkeypox and coxsackie virus.      Patient was discharged with doxycycline for his foreign body infection post removal. The doxycycline should also help with the potential bed bug infection. Additionally, to cover for scabies, permethrin cream was prescribed. Patient was discharged, will follow up with his primary care physician outpatient.          Amount and/or Complexity of Data Reviewed  Labs: ordered.    Risk  Prescription drug management.                                      Clinical Impression:  Final diagnoses:  [R00.0] Tachycardia  [S20.159A, L08.9] Foreign body of breast, superficial, infected, unspecified laterality, initial encounter - Infected right nipple ring  (Primary)  [L30.8] Pruritic dermatitis - Presume body louse.           ED Disposition Condition    Discharge Stable          ED Prescriptions       Medication Sig Dispense Start Date End Date Auth. Provider    doxycycline (VIBRA-TABS) 100 MG tablet Take 1 tablet (100 mg total) by mouth 2 (two) times daily. 13 tablet 9/10/2024 -- Mack Haynes MD    permethrin (ELIMITE) 5 % cream (Expires today) Apply topically once. Cover entire body and leave on 8-14 hours, repeat in 1 week. for 1 dose 60 g 9/10/2024 9/10/2024 Mack Haynes MD          Follow-up Information       Follow up With Specialties Details Why Contact Info    Surekha Ocasio NP Internal Medicine Schedule an appointment as soon as possible for a visit in 1 week  [address] [facility] [address]  [facility]  [address]  [phone]               Mack Haynes MD  Resident  09/10/24 0117

## 2024-09-10 NOTE — PROVIDER PROGRESS NOTES - EMERGENCY DEPT.
Encounter Date: 9/9/2024    ED Physician Progress Notes         EKG - STEMI Decision  Initial Reading: No STEMI present.  Response: No Action Needed.

## 2024-09-12 ENCOUNTER — HOSPITAL ENCOUNTER (EMERGENCY)
Facility: HOSPITAL | Age: 39
Discharge: HOME | End: 2024-09-13
Attending: EMERGENCY MEDICINE
Payer: COMMERCIAL

## 2024-09-12 DIAGNOSIS — J02.9 SORE THROAT: ICD-10-CM

## 2024-09-12 DIAGNOSIS — A53.0 POSITIVE RPR TEST: ICD-10-CM

## 2024-09-12 DIAGNOSIS — B20 HIV INFECTION, UNSPECIFIED SYMPTOM STATUS: ICD-10-CM

## 2024-09-12 DIAGNOSIS — R21 RASH: Primary | ICD-10-CM

## 2024-09-12 DIAGNOSIS — R30.0 DYSURIA: ICD-10-CM

## 2024-09-12 PROCEDURE — 80053 COMPREHEN METABOLIC PANEL: CPT | Performed by: PHYSICIAN ASSISTANT

## 2024-09-12 PROCEDURE — 96374 THER/PROPH/DIAG INJ IV PUSH: CPT

## 2024-09-12 PROCEDURE — 25000003 PHARM REV CODE 250: Performed by: PHYSICIAN ASSISTANT

## 2024-09-12 PROCEDURE — 87651 STREP A DNA AMP PROBE: CPT | Performed by: PHYSICIAN ASSISTANT

## 2024-09-12 PROCEDURE — 96361 HYDRATE IV INFUSION ADD-ON: CPT

## 2024-09-12 PROCEDURE — 99284 EMERGENCY DEPT VISIT MOD MDM: CPT | Mod: 25

## 2024-09-12 PROCEDURE — U0002 COVID-19 LAB TEST NON-CDC: HCPCS | Performed by: PHYSICIAN ASSISTANT

## 2024-09-12 PROCEDURE — 63600175 PHARM REV CODE 636 W HCPCS: Performed by: EMERGENCY MEDICINE

## 2024-09-12 PROCEDURE — 85025 COMPLETE CBC W/AUTO DIFF WBC: CPT | Performed by: PHYSICIAN ASSISTANT

## 2024-09-12 RX ORDER — KETOROLAC TROMETHAMINE 30 MG/ML
15 INJECTION, SOLUTION INTRAMUSCULAR; INTRAVENOUS
Status: COMPLETED | OUTPATIENT
Start: 2024-09-12 | End: 2024-09-12

## 2024-09-12 RX ORDER — KETOROLAC TROMETHAMINE 30 MG/ML
15 INJECTION, SOLUTION INTRAMUSCULAR; INTRAVENOUS
Status: DISCONTINUED | OUTPATIENT
Start: 2024-09-12 | End: 2024-09-12

## 2024-09-12 RX ADMIN — KETOROLAC TROMETHAMINE 15 MG: 30 INJECTION, SOLUTION INTRAMUSCULAR at 11:09

## 2024-09-12 RX ADMIN — SODIUM CHLORIDE 1000 ML: 9 INJECTION, SOLUTION INTRAVENOUS at 11:09

## 2024-09-12 NOTE — Clinical Note
"Tom Garciamaximilian Hannon was seen and treated in our emergency department on 9/12/2024.  He may return to work on 09/20/2024.       If you have any questions or concerns, please don't hesitate to call.      Ena Weeks PA-C"

## 2024-09-13 VITALS
WEIGHT: 149.94 LBS | SYSTOLIC BLOOD PRESSURE: 117 MMHG | HEART RATE: 67 BPM | HEIGHT: 71 IN | BODY MASS INDEX: 20.99 KG/M2 | TEMPERATURE: 99 F | RESPIRATION RATE: 18 BRPM | DIASTOLIC BLOOD PRESSURE: 64 MMHG | OXYGEN SATURATION: 100 %

## 2024-09-13 LAB
ALBUMIN SERPL BCP-MCNC: 3.7 G/DL (ref 3.5–5.2)
ALP SERPL-CCNC: 114 U/L (ref 55–135)
ALT SERPL W/O P-5'-P-CCNC: 16 U/L (ref 10–44)
AMORPH CRY UR QL COMP ASSIST: NORMAL
ANION GAP SERPL CALC-SCNC: 10 MMOL/L (ref 8–16)
ANISOCYTOSIS BLD QL SMEAR: SLIGHT
AST SERPL-CCNC: 28 U/L (ref 10–40)
BACTERIA #/AREA URNS AUTO: NORMAL /HPF
BASOPHILS # BLD AUTO: 0.03 K/UL (ref 0–0.2)
BASOPHILS NFR BLD: 0.4 % (ref 0–1.9)
BILIRUB SERPL-MCNC: 0.6 MG/DL (ref 0.1–1)
BILIRUB UR QL STRIP: NEGATIVE
BUN SERPL-MCNC: 11 MG/DL (ref 6–20)
C TRACH DNA SPEC QL NAA+PROBE: NOT DETECTED
CALCIUM SERPL-MCNC: 9.7 MG/DL (ref 8.7–10.5)
CHLORIDE SERPL-SCNC: 103 MMOL/L (ref 95–110)
CLARITY UR REFRACT.AUTO: ABNORMAL
CO2 SERPL-SCNC: 25 MMOL/L (ref 23–29)
COLOR UR AUTO: YELLOW
CREAT SERPL-MCNC: 1.2 MG/DL (ref 0.5–1.4)
DACRYOCYTES BLD QL SMEAR: ABNORMAL
DIFFERENTIAL METHOD BLD: ABNORMAL
EOSINOPHIL # BLD AUTO: 0.1 K/UL (ref 0–0.5)
EOSINOPHIL NFR BLD: 1.8 % (ref 0–8)
ERYTHROCYTE [DISTWIDTH] IN BLOOD BY AUTOMATED COUNT: 13.7 % (ref 11.5–14.5)
EST. GFR  (NO RACE VARIABLE): >60 ML/MIN/1.73 M^2
GLUCOSE SERPL-MCNC: 93 MG/DL (ref 70–110)
GLUCOSE UR QL STRIP: NEGATIVE
GROUP A STREP, MOLECULAR: NEGATIVE
HCT VFR BLD AUTO: 39.1 % (ref 40–54)
HGB BLD-MCNC: 13.7 G/DL (ref 14–18)
HGB UR QL STRIP: NEGATIVE
HYALINE CASTS UR QL AUTO: 0 /LPF
IMM GRANULOCYTES # BLD AUTO: 0.01 K/UL (ref 0–0.04)
IMM GRANULOCYTES NFR BLD AUTO: 0.1 % (ref 0–0.5)
KETONES UR QL STRIP: ABNORMAL
LEUKOCYTE ESTERASE UR QL STRIP: ABNORMAL
LYMPHOCYTES # BLD AUTO: 3.7 K/UL (ref 1–4.8)
LYMPHOCYTES NFR BLD: 54.4 % (ref 18–48)
MCH RBC QN AUTO: 32.6 PG (ref 27–31)
MCHC RBC AUTO-ENTMCNC: 35 G/DL (ref 32–36)
MCV RBC AUTO: 93 FL (ref 82–98)
MICROSCOPIC COMMENT: NORMAL
MONOCYTES # BLD AUTO: 0.5 K/UL (ref 0.3–1)
MONOCYTES NFR BLD: 7.7 % (ref 4–15)
N GONORRHOEA DNA SPEC QL NAA+PROBE: NOT DETECTED
NEUTROPHILS # BLD AUTO: 2.4 K/UL (ref 1.8–7.7)
NEUTROPHILS NFR BLD: 35.6 % (ref 38–73)
NITRITE UR QL STRIP: NEGATIVE
NRBC BLD-RTO: 0 /100 WBC
OVALOCYTES BLD QL SMEAR: ABNORMAL
PH UR STRIP: 6 [PH] (ref 5–8)
PLATELET # BLD AUTO: 261 K/UL (ref 150–450)
PLATELET BLD QL SMEAR: ABNORMAL
PMV BLD AUTO: 9.9 FL (ref 9.2–12.9)
POIKILOCYTOSIS BLD QL SMEAR: SLIGHT
POTASSIUM SERPL-SCNC: 4 MMOL/L (ref 3.5–5.1)
PROT SERPL-MCNC: 9 G/DL (ref 6–8.4)
PROT UR QL STRIP: ABNORMAL
RBC # BLD AUTO: 4.2 M/UL (ref 4.6–6.2)
RBC #/AREA URNS AUTO: 0 /HPF (ref 0–4)
SARS-COV-2 RDRP RESP QL NAA+PROBE: NEGATIVE
SODIUM SERPL-SCNC: 138 MMOL/L (ref 136–145)
SP GR UR STRIP: >=1.03 (ref 1–1.03)
URN SPEC COLLECT METH UR: ABNORMAL
WBC # BLD AUTO: 6.75 K/UL (ref 3.9–12.7)
WBC #/AREA URNS AUTO: 0 /HPF (ref 0–5)

## 2024-09-13 PROCEDURE — 87591 N.GONORRHOEAE DNA AMP PROB: CPT | Performed by: PHYSICIAN ASSISTANT

## 2024-09-13 PROCEDURE — 81001 URINALYSIS AUTO W/SCOPE: CPT | Performed by: PHYSICIAN ASSISTANT

## 2024-09-13 PROCEDURE — 87491 CHLMYD TRACH DNA AMP PROBE: CPT | Performed by: PHYSICIAN ASSISTANT

## 2024-09-13 PROCEDURE — 87661 TRICHOMONAS VAGINALIS AMPLIF: CPT | Performed by: PHYSICIAN ASSISTANT

## 2024-09-13 RX ORDER — IBUPROFEN 800 MG/1
800 TABLET ORAL EVERY 6 HOURS PRN
Qty: 20 TABLET | Refills: 0 | Status: SHIPPED | OUTPATIENT
Start: 2024-09-13

## 2024-09-13 NOTE — ED PROVIDER NOTES
Encounter Date: 9/12/2024       History     Chief Complaint   Patient presents with    Generalized pain     Was seen on 9/9, hasn't received results for syphilis test. Reporting worsening generalized pain and bumps, pain with urination, diarrhea.     38-year-old male with a PMHx of HIV presents to ED with rash x6 days.  Rash is painful and generalized including the palms of his hands.  He was evaluated for this 3 [place] ago [place] [facility] [place]  He was tested for syphilis which was reactive.  He was able to pull up labs from an outside facility showing past positive syphilis test.  He states that he has received penicillin shots in the past for this. Last CD4 greater than 400.     He is sexually active.  Hes had a new partner last month.      He also complaining of multiple other symptoms such as sore throat, dysuria, diarrhea, chills.  He reports compliance with doxycycline which he was prescribed 3 days ago for dermatitis and infected piercing.  He denies fever, abdominal pain, vomiting, cough.      The history is provided by the patient.     Review of patient's allergies indicates:   Allergen Reactions    Haldol [haloperidol lactate] Swelling     Throat swelling    Reglan [metoclopramide] Shortness Of Breath    Maalox [alum-mag hydroxide-simeth]      Past Medical History:   Diagnosis Date    Asthma     HIV (human immunodeficiency virus infection)      History reviewed. No pertinent surgical history.  No family history on file.  Social History     Tobacco Use    Smoking status: Every Day     Types: Cigarettes    Smokeless tobacco: Never   Substance Use Topics    Alcohol use: No    Drug use: No     Review of Systems   Constitutional:  Positive for chills. Negative for fever.   HENT:  Positive for sore throat.    Respiratory:  Negative for cough.    Gastrointestinal:  Negative for abdominal pain.   Genitourinary:  Positive for dysuria. Negative for hematuria, penile discharge and penile pain.   Musculoskeletal:  Negative  for joint swelling.   Skin:  Positive for rash.       Physical Exam     Initial Vitals [09/12/24 2027]   BP Pulse Resp Temp SpO2   126/75 90 20 97.8 °F (36.6 °C) 99 %      MAP       --         Physical Exam    Nursing note and vitals reviewed.  Constitutional: He appears well-developed and well-nourished. He is not diaphoretic. No distress.   HENT:   Head: Normocephalic and atraumatic.   Nose: Nose normal.   Mouth/Throat: Uvula is midline and mucous membranes are normal. Posterior oropharyngeal erythema present. No oropharyngeal exudate, posterior oropharyngeal edema or tonsillar abscesses.   Tonsils 0+ bilaterally   Eyes: Conjunctivae and EOM are normal.   Neck: Neck supple.   Cardiovascular:  Normal rate.           Pulmonary/Chest: No respiratory distress.   Abdominal: Abdomen is soft. There is no abdominal tenderness. There is no guarding.   Musculoskeletal:      Cervical back: Neck supple.     Neurological: He is alert and oriented to person, place, and time.   Skin: No rash noted.   Papular rash on BUE/BLE including palms of hands, trunk, face   Psychiatric: He has a normal mood and affect. Thought content normal.         ED Course   Procedures  Labs Reviewed   URINALYSIS, REFLEX TO URINE CULTURE - Abnormal       Result Value    Specimen UA Urine, Clean Catch      Color, UA Yellow      Appearance, UA Cloudy (*)     pH, UA 6.0      Specific Gravity, UA >=1.030 (*)     Protein, UA 1+ (*)     Glucose, UA Negative      Ketones, UA Trace (*)     Bilirubin (UA) Negative      Occult Blood UA Negative      Nitrite, UA Negative      Leukocytes, UA Trace (*)     Narrative:     Specimen Source->Urine   CBC W/ AUTO DIFFERENTIAL - Abnormal    WBC 6.75      RBC 4.20 (*)     Hemoglobin 13.7 (*)     Hematocrit 39.1 (*)     MCV 93      MCH 32.6 (*)     MCHC 35.0      RDW 13.7      Platelets 261      MPV 9.9      Immature Granulocytes 0.1      Gran # (ANC) 2.4      Immature Grans (Abs) 0.01      Lymph # 3.7      Mono # 0.5       Eos # 0.1      Baso # 0.03      nRBC 0      Gran % 35.6 (*)     Lymph % 54.4 (*)     Mono % 7.7      Eosinophil % 1.8      Basophil % 0.4      Platelet Estimate Appears normal      Aniso Slight      Poik Slight      Ovalocytes Occasional      Tear Drop Cells Occasional      Differential Method Automated     COMPREHENSIVE METABOLIC PANEL - Abnormal    Sodium 138      Potassium 4.0      Chloride 103      CO2 25      Glucose 93      BUN 11      Creatinine 1.2      Calcium 9.7      Total Protein 9.0 (*)     Albumin 3.7      Total Bilirubin 0.6      Alkaline Phosphatase 114      AST 28      ALT 16      eGFR >60.0      Anion Gap 10     GROUP A STREP, MOLECULAR    Group A Strep, Molecular Negative     TRICHOMONAS VAGINALIS, RNA,QUAL, URINE   SARS-COV-2 RNA AMPLIFICATION, QUAL    SARS-CoV-2 RNA, Amplification, Qual Negative     URINALYSIS MICROSCOPIC    RBC, UA 0      WBC, UA 0      Bacteria Rare      Hyaline Casts, UA 0      Amorphous, UA Moderate      Microscopic Comment SEE COMMENT      Narrative:     Specimen Source->Urine   C. TRACHOMATIS/N. GONORRHOEAE BY AMP DNA          Imaging Results    None          Medications   sodium chloride 0.9% bolus 1,000 mL 1,000 mL (0 mLs Intravenous Stopped 9/13/24 0040)   ketorolac injection 15 mg (15 mg Intravenous Given 9/12/24 4559)     Medical Decision Making  38-year-old male with a PMHx of HIV presents to ED with rash x6 days.  Nontoxic appearing. Hemodynamically stable. Afebrile. Exam as above. I will initiate workup and reassess.    Ddx:  Syphilis, mpox, hand-foot-mouth disease, dermatitis, viral pharyngitis, strep pharyngitis, COVID, UTI, urethritis    - Labs without leukocytosis   - COVID negative    - Strep negative  - UA with 1+ leukocytes.  0 WBCs and rare bacteria.  We defer treatment at this time and treat based off culture  - pending m pox results from visit 3 days ago.  I counseled patient on isolation precautions.  Detailed instructions were given on after visit  summary  - patient had a positive RPR with 1:1 titers. I suspect from past infection.  I placed an urgent referral with infectious disease given concern for syphilis and m pox  - ibuprofen prescription given for pain control  - Strict ED precautions given to return immediately for new, worsening, or concerning symptoms        Amount and/or Complexity of Data Reviewed  Labs: ordered. Decision-making details documented in ED Course.    Risk  Prescription drug management.               ED Course as of 09/13/24 0236   Fri Sep 13, 2024   0020 WBC: 6.75 [HM]   0107 SARS-CoV-2 RNA, Amplification, Qual: Negative [HM]   0107 Group A Strep, Molecular: Negative [HM]   0151 Blood, UA: Negative [HM]   0151 NITRITE UA: Negative [HM]   0151 Leukocyte Esterase, UA(!): Trace [HM]   0154 Bacteria, UA: Rare [HM]   0155 WBC, UA: 0 [HM]      ED Course User Index  [HM] Ena Weeks PA-C                           Clinical Impression:  Final diagnoses:  [R21] Rash (Primary)  [A53.0] Positive RPR test  [B20] HIV infection, unspecified symptom status  [R30.0] Dysuria          ED Disposition Condition    Discharge           ED Prescriptions       Medication Sig Dispense Start Date End Date Auth. Provider    ibuprofen (ADVIL,MOTRIN) 800 MG tablet Take 1 tablet (800 mg total) by mouth every 6 (six) hours as needed for Pain. 20 tablet 9/13/2024 -- Ena Weeks PA-C          Follow-up Information       Follow up With Specialties Details Why Contact Info Additional Information    [place] - Infectious Disease [facility] Infectious Diseases Schedule an appointment as soon as possible for a visit   [address] [address] [phone] Main Building, 1st floor near [address] entrance Please park in [facility]. [employer] parking is available on the first floor of the main parking garage.    [place] [facility] - Emergency Dept Emergency Medicine  If symptoms worsen [address]  [address]  [phone]     Surekha Ocasio, NP Internal Medicine Schedule an appointment as soon as possible for a visit   [address] [facility] [address]  [facility] [address]  [phone]                Ena Weeks PAOSVALDO  09/13/24 0237

## 2024-09-13 NOTE — ED NOTES
"  Patient identifiers for Tom Hannon 38 y.o. male checked and correct.  Chief Complaint   Patient presents with    Generalized pain     Was seen on 9/9, hasn't received results for syphilis test. Reporting worsening generalized pain and bumps, pain with urination, diarrhea.     Past Medical History:   Diagnosis Date    Asthma     HIV (human immunodeficiency virus infection)      Allergies reported:   Review of patient's allergies indicates:   Allergen Reactions    Haldol [haloperidol lactate] Swelling     Throat swelling    Reglan [metoclopramide] Shortness Of Breath    Maalox [alum-mag hydroxide-simeth]          HEENT: Denies vision changes. Denies ear drainage or hearing loss. No c/o nasal drainage. Denies dysphagia or voice changes.   Appearance: Pt awake, alert & oriented to person, place & time. Pt in no acute distress at present time. Pt is clean and well groomed with clothes appropriately fastened.   Skin: Skin warm, dry & intact. Color consistent with ethnicity. Mucous membranes moist. No breakdown or brusing noted. +painful "bumps" to arms/ legs bilat  Musculoskeletal: Patient moving all extremities well, no obvious swelling or deformities noted.   Respiratory: Respirations spontaneous, even, and non-labored. Visible chest rise noted. Airway is open and patent. No accessory muscle use noted.   Neurologic: Sensation is intact. Speech is clear and appropriate. Eyes open spontaneously, behavior appropriate to situation, follows commands, facial expression symmetrical, bilateral hand grasp equal and even, purposeful motor response noted.  Cardiac: All peripheral pulses present. No Bilateral lower extremity edema. Cap refill is <3 seconds.  Abdomen: Abdomen soft, non distended, non tender to palpation. +diarrhea  : Pt voids independently, +dysuria  "

## 2024-09-13 NOTE — DISCHARGE INSTRUCTIONS
Monkey pox isolation precautions until your test results and you have been evaluated by infectious disease:    Individuals with mpox should be isolated in a room or area separate from other family members and pets. This is particularly important for persons with extensive lesions that cannot be easily covered and those with respiratory symptoms.  If around others :  ?Skin lesions should be covered (eg, long sleeves, long pants) to minimize risk of contact with mpox lesions.  ?Patients should avoid sharing their used clothes, towels, food, utensils, or face masks with others and should not allow animals to access them.  ?Individuals with mpox should wear a well-fitting facemask when around others, even if respiratory symptoms are not present. Ideally, household members should wear a facemask when in the presence of the person with mpox as well.  Household members providing care to patients with mpox should use disposable gloves for direct contact with lesions. The gloves should be disposed of after use, followed by hand hygiene with an alcohol-based hand rub or, if visibly soiled, with soap and water.  Hand hygiene should also be performed regularly by infected individuals and by household contacts after any unprotected contact with lesions or potentially contaminated surfaces.  Similar to health care settings, as described above, care should be used when handling soiled laundry to avoid direct contact with contaminated material. Soiled laundry should not be shaken or otherwise handled in a manner that may disperse infectious particles. Laundry may be washed in a standard washing machine with water and detergent.    Please schedule an appointment with infectious disease as soon as possible    Strict ED precautions given to return immediately for new, worsening, or concerning symptoms

## 2024-09-14 LAB
MVPX DNA SPEC QL NAA+PROBE: NORMAL
ORTHOPOXVIRUS, PCR: NORMAL
SPECIMEN SOURCE: NORMAL
T VAGINALIS RRNA SPEC QL NAA+PROBE: NEGATIVE

## 2024-11-27 ENCOUNTER — PATIENT MESSAGE (OUTPATIENT)
Dept: RESEARCH | Facility: HOSPITAL | Age: 39
End: 2024-11-27
Payer: COMMERCIAL